# Patient Record
Sex: MALE | Race: WHITE | NOT HISPANIC OR LATINO | URBAN - METROPOLITAN AREA
[De-identification: names, ages, dates, MRNs, and addresses within clinical notes are randomized per-mention and may not be internally consistent; named-entity substitution may affect disease eponyms.]

---

## 2020-07-13 ENCOUNTER — OFFICE VISIT (OUTPATIENT)
Dept: URBAN - METROPOLITAN AREA CLINIC 23 | Facility: CLINIC | Age: 80
End: 2020-07-13
Payer: MEDICARE

## 2020-07-13 DIAGNOSIS — H57.813 BROW PTOSIS, BILATERAL: ICD-10-CM

## 2020-07-13 DIAGNOSIS — H02.422 MYOGENIC PTOSIS OF LEFT EYELID: Primary | ICD-10-CM

## 2020-07-13 DIAGNOSIS — H02.831 DERMATOCHALASIS OF RIGHT UPPER EYELID: ICD-10-CM

## 2020-07-13 DIAGNOSIS — H02.834 DERMATOCHALASIS OF LEFT UPPER EYELID: ICD-10-CM

## 2020-07-13 PROCEDURE — 99204 OFFICE O/P NEW MOD 45 MIN: CPT | Performed by: OPHTHALMOLOGY

## 2020-07-13 PROCEDURE — 92285 EXTERNAL OCULAR PHOTOGRAPHY: CPT | Performed by: OPHTHALMOLOGY

## 2020-07-13 RX ORDER — ERYTHROMYCIN 5 MG/G
OINTMENT OPHTHALMIC
Qty: 2 | Refills: 0 | Status: INACTIVE
Start: 2020-07-13 | End: 2021-03-10

## 2020-07-13 RX ORDER — CEPHALEXIN 500 MG/1
500 MG CAPSULE ORAL
Qty: 15 | Refills: 0 | Status: INACTIVE
Start: 2020-07-13 | End: 2021-03-10

## 2020-07-13 ASSESSMENT — INTRAOCULAR PRESSURE
OS: 10
OD: 10

## 2020-07-13 NOTE — IMPRESSION/PLAN
Impression: Myogenic ptosis of left eyelid: H02.422. Photo Interpretation: supports clinical findings and dx documented in chart. Plan: Discussed diagnosis in detail with patient. Discussed treatment options with patient. Surgical risks and benefits were discussed, explained and understood by patient. Surgical treatment is required. Patient elects to proceed with surgery. HVF 36 superior taped and untaped ordered today. If condition is visually significant. Recommend Internal Left Upper Eyelid Ptosis Repair [id number]. RL3. *Needs Medical Clearance from Cardiologist due to [address] [place] [address], must include stop and start dates. Stage approach for Bilateral Brow Repair and/or BULB. *Patient is currently being treated during COVID-19 epidemic, which limits our availability to establish proper follow up care. Patient understands these limitations, and is aware that we will continue treatment and follow up based on our availability, as determined by local state and federal guidelines.

## 2021-03-10 ENCOUNTER — OFFICE VISIT (OUTPATIENT)
Dept: URBAN - METROPOLITAN AREA CLINIC 71 | Facility: CLINIC | Age: 81
End: 2021-03-10
Payer: MEDICARE

## 2021-03-10 DIAGNOSIS — H02.423 MYOGENIC PTOSIS OF BILATERAL EYELIDS: Primary | ICD-10-CM

## 2021-03-10 PROCEDURE — 92285 EXTERNAL OCULAR PHOTOGRAPHY: CPT | Performed by: OPHTHALMOLOGY

## 2021-03-10 PROCEDURE — 99214 OFFICE O/P EST MOD 30 MIN: CPT | Performed by: OPHTHALMOLOGY

## 2021-03-10 PROCEDURE — 92081 LIMITED VISUAL FIELD XM: CPT | Performed by: OPHTHALMOLOGY

## 2021-03-10 RX ORDER — NEOMYCIN SULFATE, POLYMYXIN B SULFATE AND DEXAMETHASONE 3.5; 10000; 1 MG/G; [USP'U]/G; MG/G
OINTMENT OPHTHALMIC
Qty: 2 | Refills: 1 | Status: ACTIVE
Start: 2021-03-10

## 2021-03-10 NOTE — IMPRESSION/PLAN
Impression: Myogenic ptosis of bilateral eyelids: H02.423. Plan: [place] screen VF & photos were performed and interpreted today and demonstrated restriction of superior and temporal visual fields. This reverted to normal demonstrating >30% increase in visual field with mechanical elevation of the eyelid and brow. The patient's eyelids did not adequately respond to phenylephrine testing, indicating that the degree of ptosis will require an external levator resection to correct the eyelid ptosis. RBA were discussed with the patient and all questions were answered.

## 2021-04-28 ENCOUNTER — SURGERY (OUTPATIENT)
Dept: URBAN - METROPOLITAN AREA SURGERY 45 | Facility: SURGERY | Age: 81
End: 2021-04-28
Payer: MEDICARE

## 2021-04-28 ENCOUNTER — SURGERY (OUTPATIENT)
Dept: URBAN - METROPOLITAN AREA SURGERY 44 | Facility: SURGERY | Age: 81
End: 2021-04-28
Payer: MEDICARE

## 2021-05-05 ENCOUNTER — POST-OPERATIVE VISIT (OUTPATIENT)
Dept: URBAN - METROPOLITAN AREA CLINIC 71 | Facility: CLINIC | Age: 81
End: 2021-05-05
Payer: MEDICARE

## 2021-05-05 DIAGNOSIS — Z48.89 ENCOUNTER FOR OTHER SPECIFIED SURGICAL AFTERCARE: Primary | ICD-10-CM

## 2021-05-05 PROCEDURE — 99024 POSTOP FOLLOW-UP VISIT: CPT | Performed by: OPHTHALMOLOGY

## 2021-05-05 NOTE — IMPRESSION/PLAN
Impression: S/P ELR OU - 7 Days. Encounter for other specified surgical aftercare  Z48.89. Plan: decreased vision OD. hx of high myopia OD. DFE with myopic fundus but e/o RD. (also evaluated by Dr. Gianluca Dawson today). OCT with no submacular fluid or scarring. Decreased vision is 2/2 BERNARD OD and some exposure. Has not been using tear gel clay qhs. Also does not have RGP in today. instructed to use PFAT's every 1-2 hours, tear gel qhs, okay to restart RGP's 3 weeks s/p surgery.  RTC as scheduled on Friday for suture removal.

## 2021-05-07 ENCOUNTER — POST-OPERATIVE VISIT (OUTPATIENT)
Dept: URBAN - METROPOLITAN AREA CLINIC 72 | Facility: CLINIC | Age: 81
End: 2021-05-07
Payer: MEDICARE

## 2021-05-07 PROCEDURE — 99024 POSTOP FOLLOW-UP VISIT: CPT | Performed by: OPTOMETRIST

## 2021-05-07 ASSESSMENT — INTRAOCULAR PRESSURE
OD: 9
OS: 8

## 2021-05-07 NOTE — IMPRESSION/PLAN
Impression: S/P External Levator Resection - Upper Lid - BILATERAL OU - 9 Days. Encounter for other specified surgical aftercare  Z48.89. Excellent post op course   Post operative instructions reviewed - Condition is improving - Plan: Sutures removed in clinic today, without complications. 
Pt may D/c clay or use for itching/comfort

## 2021-11-09 ENCOUNTER — OFFICE VISIT (OUTPATIENT)
Dept: URBAN - METROPOLITAN AREA CLINIC 75 | Facility: CLINIC | Age: 81
End: 2021-11-09
Payer: MEDICARE

## 2021-11-09 DIAGNOSIS — H35.372 PUCKERING OF MACULA, LEFT EYE: ICD-10-CM

## 2021-11-09 DIAGNOSIS — H52.4 PRESBYOPIA: ICD-10-CM

## 2021-11-09 DIAGNOSIS — H43.812 VITREOUS DEGENERATION, LEFT EYE: Primary | ICD-10-CM

## 2021-11-09 DIAGNOSIS — Z96.1 PRESENCE OF INTRAOCULAR LENS: ICD-10-CM

## 2021-11-09 PROCEDURE — 92015 DETERMINE REFRACTIVE STATE: CPT | Performed by: OPTOMETRIST

## 2021-11-09 PROCEDURE — 92310 CONTACT LENS FITTING OU: CPT | Performed by: OPTOMETRIST

## 2021-11-09 PROCEDURE — 99214 OFFICE O/P EST MOD 30 MIN: CPT | Performed by: OPTOMETRIST

## 2021-11-09 ASSESSMENT — INTRAOCULAR PRESSURE
OD: 8
OS: 9

## 2021-11-09 ASSESSMENT — VISUAL ACUITY
OD: 20/40
OS: 20/20

## 2021-11-09 NOTE — IMPRESSION/PLAN
Impression: Vitreous degeneration, left eye: H43.812. Plan: Reviewed the signs and symptoms of a retinal tear and detachment in detail with the patient, including worsening flashes, new floaters, and development of a shadow/curtain in the peripheral visual field. The patient was advised to call immediately with any changes to [address] or increase in symptoms.

## 2021-11-09 NOTE — IMPRESSION/PLAN
Impression: Presbyopia: H52.4- RGP Exam. Pt is a current wearer. Plan: OD: 2+ dimple veiling, lens has mild movement, stuck inferiorly. Apical pooling, severe, mod mid perif bearing, edge is sealed off. OR: plano sph OS: good translation, mod apical pooling, mild mid perif bearing 1+ edge lift.  
OR: -0.25 sph

## 2021-11-15 ENCOUNTER — OFFICE VISIT (OUTPATIENT)
Dept: URBAN - METROPOLITAN AREA CLINIC 75 | Facility: CLINIC | Age: 81
End: 2021-11-15
Payer: MEDICARE

## 2021-11-15 DIAGNOSIS — H18.611 KERATOCONUS, STABLE, RIGHT EYE: Primary | ICD-10-CM

## 2021-11-15 PROCEDURE — 92310 CONTACT LENS FITTING OU: CPT | Performed by: OPTOMETRIST

## 2021-11-15 ASSESSMENT — INTRAOCULAR PRESSURE: OD: 5

## 2021-11-15 NOTE — IMPRESSION/PLAN
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 Plan: OD: SA PWR: -4.00 FLIP: 15.6 EDG:+75/-75 CT: 0.21 OR: +7.25  also +7.00 -1.50 x135 20/25 Notes: Coverage over cone, haptic @ 10 & 4, mild prolapse inferior, flatten mid perif & limbus, steepen haptics.

## 2021-11-15 NOTE — IMPRESSION/PLAN
Impression: Presbyopia: H52.4- RGP Exam OS. Pt is a current wearer. Plan: RGP Fit notes OS: good translation, moderate apical pooling, mild mid peripheral bearing 1+ edge lift.  
OR: -0.25 sph

## 2021-12-08 ENCOUNTER — OFFICE VISIT (OUTPATIENT)
Dept: URBAN - METROPOLITAN AREA CLINIC 75 | Facility: CLINIC | Age: 81
End: 2021-12-08

## 2021-12-08 PROCEDURE — 92310 CONTACT LENS FITTING OU: CPT | Performed by: OPTOMETRIST

## 2021-12-08 NOTE — IMPRESSION/PLAN
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 Plan: OD Notes: Coverage over cone, running thin on limbus nasally. VA: 20/40 OR: [place] sph OU:20/25

## 2021-12-08 NOTE — IMPRESSION/PLAN
Impression: Presbyopia: H52.4- RGP Exam OS. Pt is a current wearer. Plan: RGP Fit notes OS: good translation, fit: mild pooling apically, mid bearing peripherally VA: 20/25-1 OR: [place] sph

## 2022-01-10 ENCOUNTER — OFFICE VISIT (OUTPATIENT)
Dept: URBAN - METROPOLITAN AREA CLINIC 75 | Facility: CLINIC | Age: 82
End: 2022-01-10

## 2022-01-10 PROCEDURE — 92310 CONTACT LENS FITTING OU: CPT | Performed by: OPTOMETRIST

## 2022-01-10 ASSESSMENT — VISUAL ACUITY
OD: 20/150
OS: 20/25

## 2022-01-10 NOTE — IMPRESSION/PLAN
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 Plan: PT states he would like to temporarily use glasses until he can find a neurologist who can assist in reducing his tremors. A [place] refraction was completed and dispensed per patient. Instructed pt to buy a+1.00 OTC pair of readers before getting prescription glasses to see if vision is corrected. (Pop out right lens) Educated pt that the hard lens will give him the best optics.

## 2023-04-20 ENCOUNTER — OFFICE VISIT (OUTPATIENT)
Dept: URBAN - METROPOLITAN AREA CLINIC 75 | Facility: CLINIC | Age: 83
End: 2023-04-20

## 2023-04-20 DIAGNOSIS — H18.611 KERATOCONUS, STABLE, RIGHT EYE: Primary | ICD-10-CM

## 2023-04-20 DIAGNOSIS — H52.4 PRESBYOPIA: ICD-10-CM

## 2023-04-20 PROCEDURE — 92310 CONTACT LENS FITTING OU: CPT | Performed by: OPTOMETRIST

## 2023-04-20 NOTE — IMPRESSION/PLAN
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 Fit: Full Coverage OR: [place] - 20/40 Plan: Discussed care, application, and removal. 

Ok to dispense lens today. Pt to return for R/C after 4 hours of wear, preferably in the afternoon.

## 2023-04-20 NOTE — IMPRESSION/PLAN
Impression: Presbyopia: H52.4- RGP Exam OS. Pt is a current wearer. Plan: RGP Fit notes OS: Apical pooling - rides sup - adequate fit OR: [place] sph  20/30 -1 Pt requested back up lens to be ordered OS. One lens to be ordered and will asses OD lens in 2 weeks and order backup if lens requires no changes.

## 2023-04-24 ENCOUNTER — OFFICE VISIT (OUTPATIENT)
Dept: URBAN - METROPOLITAN AREA CLINIC 71 | Facility: CLINIC | Age: 83
End: 2023-04-24
Payer: MEDICARE

## 2023-04-24 DIAGNOSIS — H57.813 BROW PTOSIS, BILATERAL: Primary | ICD-10-CM

## 2023-04-24 DIAGNOSIS — Z41.1 ENCOUNTER FOR COSMETIC SURGERY: ICD-10-CM

## 2023-04-24 DIAGNOSIS — H02.423 MYOGENIC PTOSIS OF BILATERAL EYELIDS: ICD-10-CM

## 2023-04-24 PROCEDURE — 99213 OFFICE O/P EST LOW 20 MIN: CPT | Performed by: OPHTHALMOLOGY

## 2023-04-24 NOTE — IMPRESSION/PLAN
Impression: Encounter for cosmetic surgery: Z41.1. Plan: Discussed cosmetic botox in the [place] in the future if patient prefers.

## 2023-04-24 NOTE — IMPRESSION/PLAN
Impression: Myogenic ptosis of bilateral eyelids: H02.423. Plan: Improved lid height/contour and hooding,. F/u prn.

## 2023-04-24 NOTE — IMPRESSION/PLAN
Impression: Brow ptosis, bilateral: H57.813. Plan: brow ptosis OU but lid margin above pupil OU; not yet functionally significant. Explained that patient would require direct brow lift, which would lead to visible scar in supra brow area. Will hold off for now. Explained that squinting and furrowing is also pulling brows down; instructed patient to avoid these movements if possible.

## 2023-04-27 ENCOUNTER — OFFICE VISIT (OUTPATIENT)
Dept: URBAN - METROPOLITAN AREA CLINIC 75 | Facility: CLINIC | Age: 83
End: 2023-04-27

## 2023-04-27 DIAGNOSIS — H52.4 PRESBYOPIA: Primary | ICD-10-CM

## 2023-04-27 PROCEDURE — 92310 CONTACT LENS FITTING OU: CPT | Performed by: OPTOMETRIST

## 2023-04-27 NOTE — IMPRESSION/PLAN
Impression: Presbyopia: H52.4. OD Scleral: Full coverage OR: PLANO Plan: Discussed care, application, and removal. 

Ok to dispense final OD lens today. Pt st he will be ordering backup lens and understands fees and how to store lens dry.

## 2024-03-05 ENCOUNTER — OFFICE VISIT (OUTPATIENT)
Dept: URBAN - METROPOLITAN AREA CLINIC 75 | Facility: CLINIC | Age: 84
End: 2024-03-05
Payer: MEDICARE

## 2024-03-05 DIAGNOSIS — H18.603 BILATERAL KERATOCONUS: ICD-10-CM

## 2024-03-05 DIAGNOSIS — H35.372 PUCKERING OF MACULA, LEFT EYE: Primary | ICD-10-CM

## 2024-03-05 DIAGNOSIS — H52.4 PRESBYOPIA: ICD-10-CM

## 2024-03-05 DIAGNOSIS — H43.813 VITREOUS DEGENERATION, BILATERAL: ICD-10-CM

## 2024-03-05 PROCEDURE — 92310 CONTACT LENS FITTING OU: CPT | Performed by: OPTOMETRIST

## 2024-03-05 PROCEDURE — 99214 OFFICE O/P EST MOD 30 MIN: CPT | Performed by: OPTOMETRIST

## 2024-03-05 PROCEDURE — 92025 CPTRIZED CORNEAL TOPOGRAPHY: CPT | Performed by: OPTOMETRIST

## 2024-03-05 PROCEDURE — 92134 CPTRZ OPH DX IMG PST SGM RTA: CPT | Performed by: OPTOMETRIST

## 2024-03-05 ASSESSMENT — INTRAOCULAR PRESSURE
OD: 8
OS: 9

## 2024-03-05 ASSESSMENT — KERATOMETRY
OD: 57.00
OS: 40.38

## 2024-03-25 ENCOUNTER — OFFICE VISIT (OUTPATIENT)
Dept: URBAN - METROPOLITAN AREA CLINIC 75 | Facility: CLINIC | Age: 84
End: 2024-03-25
Payer: MEDICARE

## 2024-03-25 DIAGNOSIS — H18.603 BILATERAL KERATOCONUS: Primary | ICD-10-CM

## 2024-03-25 DIAGNOSIS — H52.4 PRESBYOPIA: ICD-10-CM

## 2024-03-25 PROCEDURE — 92310 CONTACT LENS FITTING OU: CPT | Performed by: OPTOMETRIST

## 2024-05-10 ENCOUNTER — OFFICE VISIT (OUTPATIENT)
Dept: URBAN - METROPOLITAN AREA CLINIC 75 | Facility: CLINIC | Age: 84
End: 2024-05-10

## 2024-05-10 DIAGNOSIS — H52.4 PRESBYOPIA: ICD-10-CM

## 2024-05-10 DIAGNOSIS — H18.603 BILATERAL KERATOCONUS: Primary | ICD-10-CM

## 2024-05-10 PROCEDURE — 92310 CONTACT LENS FITTING OU: CPT | Performed by: OPTOMETRIST

## 2024-06-05 ENCOUNTER — OFFICE VISIT (OUTPATIENT)
Dept: URBAN - METROPOLITAN AREA CLINIC 75 | Facility: CLINIC | Age: 84
End: 2024-06-05
Payer: MEDICARE

## 2024-06-05 DIAGNOSIS — H18.603 BILATERAL KERATOCONUS: Primary | ICD-10-CM

## 2024-06-05 DIAGNOSIS — H52.4 PRESBYOPIA: ICD-10-CM

## 2024-06-05 PROCEDURE — 92310 CONTACT LENS FITTING OU: CPT | Performed by: OPTOMETRIST

## 2025-04-14 ENCOUNTER — OFFICE VISIT (OUTPATIENT)
Dept: URBAN - METROPOLITAN AREA CLINIC 75 | Facility: CLINIC | Age: 85
End: 2025-04-14
Payer: MEDICARE

## 2025-04-14 DIAGNOSIS — H35.372 PUCKERING OF MACULA, LEFT EYE: Primary | ICD-10-CM

## 2025-04-14 DIAGNOSIS — H35.362 DRUSEN (DEGENERATIVE) OF MACULA, LEFT EYE: ICD-10-CM

## 2025-04-14 PROCEDURE — 99213 OFFICE O/P EST LOW 20 MIN: CPT | Performed by: OPTOMETRIST

## 2025-04-14 PROCEDURE — 92133 CPTRZD OPH DX IMG PST SGM ON: CPT | Performed by: OPTOMETRIST

## 2025-04-14 PROCEDURE — 92134 CPTRZ OPH DX IMG PST SGM RTA: CPT | Performed by: OPTOMETRIST

## 2025-04-14 ASSESSMENT — INTRAOCULAR PRESSURE
OS: 9
OD: 8